# Patient Record
Sex: FEMALE | Race: WHITE | NOT HISPANIC OR LATINO | Employment: OTHER | URBAN - METROPOLITAN AREA
[De-identification: names, ages, dates, MRNs, and addresses within clinical notes are randomized per-mention and may not be internally consistent; named-entity substitution may affect disease eponyms.]

---

## 2018-02-14 NOTE — PROGRESS NOTES
PAT note: PAT call made 02/14/2018 at 1437. Spoke with pt, Aracelis. Health history, allergies, medications and pre-procedure instructions reviewed with patient.Pt verbalized understanding of instructions.Pt requests no sedation.

## 2018-02-16 ENCOUNTER — HOSPITAL ENCOUNTER (OUTPATIENT)
Dept: RADIOLOGY | Facility: REHABILITATION | Age: 48
End: 2018-02-16
Attending: PHYSICAL MEDICINE & REHABILITATION
Payer: COMMERCIAL

## 2018-02-16 ENCOUNTER — HOSPITAL ENCOUNTER (OUTPATIENT)
Dept: PAIN MANAGEMENT | Facility: REHABILITATION | Age: 48
End: 2018-02-16
Attending: PHYSICAL MEDICINE & REHABILITATION
Payer: COMMERCIAL

## 2018-02-16 VITALS
OXYGEN SATURATION: 97 % | TEMPERATURE: 98 F | RESPIRATION RATE: 17 BRPM | BODY MASS INDEX: 24.4 KG/M2 | HEIGHT: 70 IN | DIASTOLIC BLOOD PRESSURE: 73 MMHG | SYSTOLIC BLOOD PRESSURE: 115 MMHG | HEART RATE: 73 BPM | WEIGHT: 170.42 LBS

## 2018-02-16 PROCEDURE — 700111 HCHG RX REV CODE 636 W/ 250 OVERRIDE (IP)

## 2018-02-16 PROCEDURE — 62321 NJX INTERLAMINAR CRV/THRC: CPT

## 2018-02-16 PROCEDURE — 700117 HCHG RX CONTRAST REV CODE 255

## 2018-02-16 RX ORDER — LIDOCAINE HYDROCHLORIDE 20 MG/ML
INJECTION, SOLUTION EPIDURAL; INFILTRATION; INTRACAUDAL; PERINEURAL
Status: COMPLETED
Start: 2018-02-16 | End: 2018-02-16

## 2018-02-16 RX ORDER — DEXAMETHASONE SODIUM PHOSPHATE 10 MG/ML
INJECTION, SOLUTION INTRAMUSCULAR; INTRAVENOUS
Status: COMPLETED
Start: 2018-02-16 | End: 2018-02-16

## 2018-02-16 RX ADMIN — DEXAMETHASONE SODIUM PHOSPHATE 10 MG: 10 INJECTION, SOLUTION INTRAMUSCULAR; INTRAVENOUS at 09:09

## 2018-02-16 RX ADMIN — IOHEXOL 3 ML: 240 INJECTION, SOLUTION INTRATHECAL; INTRAVASCULAR; INTRAVENOUS; ORAL at 09:08

## 2018-02-16 ASSESSMENT — PAIN SCALES - GENERAL
PAINLEVEL_OUTOF10: 7
PAINLEVEL_OUTOF10: 6

## 2018-02-16 NOTE — PROGRESS NOTES
.Fluids tolerated well. Ice compress applied to the affected area. Reviewed home care instructions and understood by patient.No procedural complication noted.

## 2018-02-16 NOTE — PROGRESS NOTES
Medication reconciliation reviewed with patient. Denied taking any blood thinners and any  any anti- inflammatories medications. .Patient had a  / spouse .Home care form and verbal  instruction given to patient and verbalized understanding.Patient refused sedation and she ate this morning at 0700 AM Dr. Langford made aware . Hand off reported to ACE Moreira RN.

## 2018-02-16 NOTE — PROGRESS NOTES
Patient positioned pre-procedure by RN,CST, and xray tech. Pillow placed under lower legs and feet for support.

## 2018-02-17 NOTE — PROCEDURES
DATE OF SERVICE:  02/16/2018    PROCEDURES PERFORMED:  C7-T1 interlaminar epidural steroid injection with 20   mg of Decadron under fluoroscopic guidance.    INDICATIONS:  The patient is a patient of [facility] with neck   pain, primarily left-sided and symptoms going down into the hands bilaterally.    She does have cervical spondylosis at C5-C6 and C6-C7 with mild central   canal narrowing, which is felt to be the cause of her symptoms.  For this   reason, a C7-T1 interlaminar epidural steroid injection was chosen for today.    DESCRIPTION OF PROCEDURE:  After appropriate informed consent was obtained,   she was placed prone on the table.    Her skin was thoroughly cleansed with Betadine swabs x3.    Following this, the subcutaneous intermuscular and interligamentous region was   anesthetized with lidocaine.    A 3.5-inch, 20-gauge Tuohy catheter was directed under intermittent   fluoroscopic guidance to the left C7-T1 lamina.  Once the lamina was detected,   the needle was directed cephalad medially and loss of resistance technique   was used to determine the epidural space.    EPIDUROGRAM:  Omnipaque 1 mL was placed in the midline at C7-T1.  This proved   to be subligamentous with good cephalad and caudad flow and the flow was   unrestricted.  A contralateral oblique view was used to determine proper depth   and dye flow as well.    Decadron 20 mg was placed at C7-T1.    A washout view showed dye flowing up to C5-C6 on the left and to C6-C7 clearly   on the right.    She tolerated the procedure well without procedure complications.    She was referred to the recovery area and will follow up in the office in the   next 2-3 weeks to monitor response to the injection.       ____________________________________     MD TRAN Cordova / JESUS    DD:  02/16/2018 09:14:25  DT:  02/16/2018 23:33:26    D#:  [id number]  Job#:  [id number]

## 2018-03-21 ENCOUNTER — TELEPHONE (OUTPATIENT)
Dept: MEDICAL GROUP | Facility: PHYSICIAN GROUP | Age: 48
End: 2018-03-21

## 2018-03-21 ENCOUNTER — OFFICE VISIT (OUTPATIENT)
Dept: MEDICAL GROUP | Facility: PHYSICIAN GROUP | Age: 48
End: 2018-03-21
Payer: COMMERCIAL

## 2018-03-21 VITALS
BODY MASS INDEX: 24.77 KG/M2 | SYSTOLIC BLOOD PRESSURE: 110 MMHG | DIASTOLIC BLOOD PRESSURE: 72 MMHG | HEIGHT: 70 IN | RESPIRATION RATE: 14 BRPM | OXYGEN SATURATION: 93 % | WEIGHT: 173 LBS | HEART RATE: 74 BPM | TEMPERATURE: 98.6 F

## 2018-03-21 DIAGNOSIS — N95.1 MENOPAUSAL SYNDROME (HOT FLASHES): ICD-10-CM

## 2018-03-21 DIAGNOSIS — G89.29 CHRONIC RIGHT-SIDED LOW BACK PAIN WITH RIGHT-SIDED SCIATICA: ICD-10-CM

## 2018-03-21 DIAGNOSIS — G89.29 NECK PAIN, CHRONIC: ICD-10-CM

## 2018-03-21 DIAGNOSIS — J30.2 CHRONIC SEASONAL ALLERGIC RHINITIS, UNSPECIFIED TRIGGER: ICD-10-CM

## 2018-03-21 DIAGNOSIS — E03.9 ACQUIRED HYPOTHYROIDISM: ICD-10-CM

## 2018-03-21 DIAGNOSIS — H92.01 RIGHT EAR PAIN: ICD-10-CM

## 2018-03-21 DIAGNOSIS — Z00.00 PREVENTATIVE HEALTH CARE: ICD-10-CM

## 2018-03-21 DIAGNOSIS — M54.2 NECK PAIN, CHRONIC: ICD-10-CM

## 2018-03-21 DIAGNOSIS — M54.41 CHRONIC RIGHT-SIDED LOW BACK PAIN WITH RIGHT-SIDED SCIATICA: ICD-10-CM

## 2018-03-21 PROCEDURE — 99204 OFFICE O/P NEW MOD 45 MIN: CPT | Performed by: NURSE PRACTITIONER

## 2018-03-21 RX ORDER — THYROID 30 MG/1
30 TABLET ORAL DAILY
COMMUNITY
End: 2019-06-26

## 2018-03-21 RX ORDER — DIAZEPAM 10 MG/1
10 TABLET ORAL EVERY 6 HOURS PRN
COMMUNITY

## 2018-03-21 RX ORDER — CIPROFLOXACIN AND DEXAMETHASONE 3; 1 MG/ML; MG/ML
4 SUSPENSION/ DROPS AURICULAR (OTIC) 2 TIMES DAILY
Qty: 1 BOTTLE | Refills: 0 | Status: SHIPPED | OUTPATIENT
Start: 2018-03-21 | End: 2018-11-26

## 2018-03-21 ASSESSMENT — PATIENT HEALTH QUESTIONNAIRE - PHQ9: CLINICAL INTERPRETATION OF PHQ2 SCORE: 0

## 2018-03-21 NOTE — TELEPHONE ENCOUNTER
VOICEMAIL  1. Caller Name: Aracelis                      Call Back Number: [phone] (home)       2. Message: Patient states the ear drops were $300 and she cannot afford that. They have ciprodex or ofloxacin in stock. Please send new RX.     3. Patient approves office to leave a detailed voicemail/MyChart message: N\A

## 2018-03-21 NOTE — ASSESSMENT & PLAN NOTE
Chronic back pain.  Has herniated disk in neck.  Recent epidural in neck without good results.    Sees Dr. Cosby at [facility] who is following her neck and back pain.  Takes CBD oil, tumeric, luten, fish oil, collagen. Takes 5-10 mg valium 4-5 times/month.

## 2018-03-21 NOTE — PROGRESS NOTES
Aracelis Seals is a 47 y.o. female here today to establish care and for evaluation and management of:    HPI:    Hypothyroidism  Taking on [place] 30 mg daily for last 5-6 years.  States she is feeling good, no hair loss reported, no difficulty swallowing.     Lumbago  Chronic back pain.  Has herniated disk in neck.  Recent epidural in neck without good results.    Sees Dr. Cosby at [facility] who is following her neck and back pain.  Takes CBD oil, tumeric, luten, fish oil, collagen. Takes 5-10 mg valium 4-5 times/month.     Neck pain, chronic  Seeing a physical therapist, using traction on neck bid and this is helping.     Right ear pain  Started last night with right ear pain, fever, chills, no recent cold.  History of allergies. Tragul pain, no exudate.     Menopausal syndrome (hot flashes)  Reports improvement with progesterone.  States that due to her job as a , she is exposed to plant based (strogen) dyes and the progesterone helps balance this.  Has used compounded cream without good results.  Requesting refill on tablets.  Up to date on mammogram.  Records requested.       Current medicines (including changes today)  Current Outpatient Prescriptions   Medication Sig Dispense Refill   • thyroid (ARMOUR THYROID) 30 MG Tab Take 30 mg by mouth every day.     • LUTEIN PO Take  by mouth.     • ciprofloxacin (CIPRO HC) 0.2-1 % Suspension Place 3 Drops in right ear 2 times a day. Administer drops to both ears. 1 Bottle 0   • progesterone (PROMETRIUM) 100 MG Cap Take 1 Cap by mouth every day. 30 Cap 11   • TURMERIC PO Take  by mouth.     • Non Formulary Request      • Cholecalciferol (VITAMIN D PO) Take  by mouth.     • MAGNESIUM PO Take  by mouth.     • diazepam (VALIUM) 10 MG tablet Take 10 mg by mouth every 6 hours as needed for Anxiety.       No current facility-administered medications for this visit.        She  has a past medical history of Allergy; Hypothyroidism; Insomnia; and  "Lumbago.    She  has a past surgical history that includes laminotomy ().    Social History   Substance Use Topics   • Smoking status: Never Smoker   • Smokeless tobacco: Never Used   • Alcohol use 2.4 oz/week     4 Glasses of wine per week      Comment: week       Social History     Social History Narrative   • No narrative on file       Family History   Problem Relation Age of Onset   • Heart Disease Father    • Heart Disease Brother        Family Status   Relation Status   • Mother Alive   • Father Alive   • Sister Alive   • Brother Alive   • Maternal Grandmother    • Maternal Grandfather    • Paternal Grandmother    • Paternal Grandfather    • Brother Alive         ROS  As stated in hpi  All other systems reviewed and are negative     Objective:     Blood pressure 110/72, pulse 74, temperature 37 °C (98.6 °F), resp. rate 14, height 1.778 m (5' 10\"), weight 78.5 kg (173 lb), last menstrual period 2018, SpO2 93 %, not currently breastfeeding. Body mass index is 24.82 kg/m².  Physical Exam:    Constitutional: Alert, no distress.  Skin: Warm, dry, good turgor, no rashes in visible areas.  Eye: Equal, round and reactive, conjunctiva clear, lids normal.  ENMT: Lips without lesions, good dentition, oropharynx clear.  Right ear with pain over tragus.    Neck: Trachea midline, no masses, no thyromegaly. No cervical or supraclavicular lymphadenopathy.  Respiratory: Unlabored respiratory effort, lungs clear to auscultation, no wheezes, no ronchi.  Cardiovascular: Normal S1, S2, no murmur, no edema.  Abdomen: Soft, non-tender, no masses, no hepatosplenomegaly.  Psych: Alert and oriented x3, normal affect and mood.        Assessment and Plan:   The following treatment plan was discussed    1. Acquired hypothyroidism  This is a new problem to me. Chronic. Ongoing. [place] 30 mg daily. Due for TSH.  Labs ordered. Monitor and follow.    2. Chronic right-sided low back pain with " right-sided sciatica  This is a new problem to me. Chronic. Ongoing issue. Is seeing [facility] spine doctor down some. Patient is taking several over-the-counter items including tumor can CBD oil.    3. Chronic seasonal allergic rhinitis, unspecified trigger  This is a new problem to me. Chronic. Ongoing. Size all daily. Recommended she add Flonase for breakthrough symptoms. Monitor and follow.    4. Neck pain, chronic  This is a new problem to me. Chronic. Ongoing issue. Unstable. Is being followed by [facility] spine. Recent epidural without improvement. She is doing some traction and physical therapy. She hopes this will help her. Monitor and follow.    5. Preventative health care  Due for preventative healthcare labs. Will also rule out any autoimmune disease due to her history of thyroid issues and chronic back and neck pain. Monitor and follow labs.  - CBC WITH DIFFERENTIAL; Future  - COMP METABOLIC PANEL; Future  - TSH WITH REFLEX TO FT4; Future  - LIPID PROFILE; Future  - VITAMIN D,25 HYDROXY; Future  - CAREY ANTIBODY WITH REFLEX; Future  - RHEUMATOID ARTHRITIS FACTOR; Future  - WESTERGREN SED RATE; Future  - CRP QUANTITIVE (NON-CARDIAC); Future    6. Right ear pain  This is a new problem to me. Acute. This may represent an otitis externa. Cortisporin/Cipro drops to right ear twice a day. Monitor and follow.    7. Menopausal syndrome (hot flashes)  This is a new problem to me. Chronic. Ongoing. Progesterone 100 mg daily. Refill provided. Monitor and follow. Request records for most recent Pap and mammogram.      Records requested.  Followup: Return in about 1 year (around 3/21/2019) for Annual.

## 2018-03-21 NOTE — ASSESSMENT & PLAN NOTE
Reports improvement with progesterone.  States that due to her job as a , she is exposed to plant based (strogen) dyes and the progesterone helps balance this.  Has used compounded cream without good results.  Requesting refill on tablets.  Up to date on mammogram.  Records requested.

## 2018-03-21 NOTE — ASSESSMENT & PLAN NOTE
Taking on [place] 30 mg daily for last 5-6 years.  States she is feeling good, no hair loss reported, no difficulty swallowing.

## 2018-03-21 NOTE — ASSESSMENT & PLAN NOTE
Started last night with right ear pain, fever, chills, no recent cold.  History of allergies. Tragul pain, no exudate.

## 2018-03-21 NOTE — LETTER
[facility]  ZACK Dumas  [address]  [place] [address]  Fax: [phone]   Authorization for Release/Disclosure of   Protected Health Information   Name: ARACELIS FARAH : 1970 SSN: [id number]   Address: [address] [place], [place] [address] [place] [address] Phone:    [phone] (home)    I authorize the entity listed below to release/disclose the PHI below to:   [facility]/ZACK Dumas and ZACK Dumas   Provider or Entity Name:  [facility]   Address   City, State, Zip   Phone:      Fax:     Reason for request: continuity of care   Information to be released:    [  ] LAST COLONOSCOPY,  including any PATH REPORT and follow-up  [  ] LAST FIT/COLOGUARD RESULT [  ] LAST DEXA  [ x ] LAST MAMMOGRAM  [  ] LAST PAP  [  ] LAST LABS [  ] RETINA EXAM REPORT  [  ] IMMUNIZATION RECORDS  [  ] Release all info      [  ] Check here and initial the line next to each item to release ALL health information INCLUDING  _____ Care and treatment for drug and / or alcohol abuse  _____ HIV testing, infection status, or AIDS  _____ Genetic Testing    DATES OF SERVICE OR TIME PERIOD TO BE DISCLOSED: _____________  I understand and acknowledge that:  * This Authorization may be revoked at any time by you in writing, except if your health information has already been used or disclosed.  * Your health information that will be used or disclosed as a result of you signing this authorization could be re-disclosed by the recipient. If this occurs, your re-disclosed health information may no longer be protected by State or Federal laws.  * You may refuse to sign this Authorization. Your refusal will not affect your ability to obtain treatment.  * This Authorization becomes effective upon signing and will  on (date) __________.      If no date is indicated, this Authorization will  one (1) year from the signature date.    Name: Aracelis Farah    Signature:   Date:     3/21/2018          PLEASE FAX REQUESTED RECORDS BACK TO: [phone]

## 2018-03-22 NOTE — TELEPHONE ENCOUNTER
Ciprodex is $290.00 call [facility] to look for alternatives Patient has a high deductible for non maintenance  medication so [facility] was able to help by giving me a web site ciprodex.com to get a saving card that can drop the co-pay to $30.00.  Pt has been notified of this.

## 2018-03-27 ENCOUNTER — TELEPHONE (OUTPATIENT)
Dept: MEDICAL GROUP | Facility: PHYSICIAN GROUP | Age: 48
End: 2018-03-27

## 2018-03-27 DIAGNOSIS — M54.9 CHRONIC BACK PAIN, UNSPECIFIED BACK LOCATION, UNSPECIFIED BACK PAIN LATERALITY: ICD-10-CM

## 2018-03-27 DIAGNOSIS — G89.29 CHRONIC BACK PAIN, UNSPECIFIED BACK LOCATION, UNSPECIFIED BACK PAIN LATERALITY: ICD-10-CM

## 2018-03-28 NOTE — TELEPHONE ENCOUNTER
Phone Number Called: [phone]    Message: Patients insurance requires a referral so she can be seen by [facility], patient gets epidural there, they are also want to know if the referral can be dated for January 22 as well, patient saw them in January but was not established with PCP until 3/21/18. Please advise    Left Message for patient to call back: no

## 2018-03-28 NOTE — TELEPHONE ENCOUNTER
VOICEMAIL  1. Caller Name: Tommy[facility]                      Call Back Number: [phone]    2. Message: Tommy stated having issue with patient and have question please call    3. Patient approves office to leave a detailed voicemail/MyChart message: N\A

## 2018-04-10 ENCOUNTER — HOSPITAL ENCOUNTER (OUTPATIENT)
Dept: LAB | Facility: MEDICAL CENTER | Age: 48
End: 2018-04-10
Attending: NURSE PRACTITIONER
Payer: COMMERCIAL

## 2018-04-10 DIAGNOSIS — Z00.00 PREVENTATIVE HEALTH CARE: ICD-10-CM

## 2018-04-10 LAB
25(OH)D3 SERPL-MCNC: 34 NG/ML (ref 30–100)
ALBUMIN SERPL BCP-MCNC: 4.9 G/DL (ref 3.2–4.9)
ALBUMIN/GLOB SERPL: 2.9 G/DL
ALP SERPL-CCNC: 35 U/L (ref 30–99)
ALT SERPL-CCNC: 24 U/L (ref 2–50)
ANION GAP SERPL CALC-SCNC: 4 MMOL/L (ref 0–11.9)
AST SERPL-CCNC: 18 U/L (ref 12–45)
BASOPHILS # BLD AUTO: 0.8 % (ref 0–1.8)
BASOPHILS # BLD: 0.04 K/UL (ref 0–0.12)
BILIRUB SERPL-MCNC: 0.5 MG/DL (ref 0.1–1.5)
BUN SERPL-MCNC: 18 MG/DL (ref 8–22)
CALCIUM SERPL-MCNC: 9.1 MG/DL (ref 8.5–10.5)
CHLORIDE SERPL-SCNC: 105 MMOL/L (ref 96–112)
CHOLEST SERPL-MCNC: 255 MG/DL (ref 100–199)
CO2 SERPL-SCNC: 28 MMOL/L (ref 20–33)
CREAT SERPL-MCNC: 0.74 MG/DL (ref 0.5–1.4)
CRP SERPL HS-MCNC: 0.03 MG/DL (ref 0–0.75)
EOSINOPHIL # BLD AUTO: 0.17 K/UL (ref 0–0.51)
EOSINOPHIL NFR BLD: 3.3 % (ref 0–6.9)
ERYTHROCYTE [DISTWIDTH] IN BLOOD BY AUTOMATED COUNT: 44.8 FL (ref 35.9–50)
ERYTHROCYTE [SEDIMENTATION RATE] IN BLOOD BY WESTERGREN METHOD: 8 MM/HOUR (ref 0–20)
GLOBULIN SER CALC-MCNC: 1.7 G/DL (ref 1.9–3.5)
GLUCOSE SERPL-MCNC: 88 MG/DL (ref 65–99)
HCT VFR BLD AUTO: 41.3 % (ref 37–47)
HDLC SERPL-MCNC: 64 MG/DL
HGB BLD-MCNC: 14.1 G/DL (ref 12–16)
IMM GRANULOCYTES # BLD AUTO: 0.01 K/UL (ref 0–0.11)
IMM GRANULOCYTES NFR BLD AUTO: 0.2 % (ref 0–0.9)
LDLC SERPL CALC-MCNC: 158 MG/DL
LYMPHOCYTES # BLD AUTO: 1.76 K/UL (ref 1–4.8)
LYMPHOCYTES NFR BLD: 34.6 % (ref 22–41)
MCH RBC QN AUTO: 33.7 PG (ref 27–33)
MCHC RBC AUTO-ENTMCNC: 34.1 G/DL (ref 33.6–35)
MCV RBC AUTO: 98.6 FL (ref 81.4–97.8)
MONOCYTES # BLD AUTO: 0.36 K/UL (ref 0–0.85)
MONOCYTES NFR BLD AUTO: 7.1 % (ref 0–13.4)
NEUTROPHILS # BLD AUTO: 2.75 K/UL (ref 2–7.15)
NEUTROPHILS NFR BLD: 54 % (ref 44–72)
NRBC # BLD AUTO: 0 K/UL
NRBC BLD-RTO: 0 /100 WBC
PLATELET # BLD AUTO: 240 K/UL (ref 164–446)
PMV BLD AUTO: 9.2 FL (ref 9–12.9)
POTASSIUM SERPL-SCNC: 4.2 MMOL/L (ref 3.6–5.5)
PROT SERPL-MCNC: 6.6 G/DL (ref 6–8.2)
RBC # BLD AUTO: 4.19 M/UL (ref 4.2–5.4)
RHEUMATOID FACT SER IA-ACNC: <10 IU/ML (ref 0–14)
SODIUM SERPL-SCNC: 137 MMOL/L (ref 135–145)
TRIGL SERPL-MCNC: 163 MG/DL (ref 0–149)
TSH SERPL DL<=0.005 MIU/L-ACNC: 2.22 UIU/ML (ref 0.38–5.33)
WBC # BLD AUTO: 5.1 K/UL (ref 4.8–10.8)

## 2018-04-10 PROCEDURE — 86431 RHEUMATOID FACTOR QUANT: CPT

## 2018-04-10 PROCEDURE — 86235 NUCLEAR ANTIGEN ANTIBODY: CPT | Mod: 91

## 2018-04-10 PROCEDURE — 82306 VITAMIN D 25 HYDROXY: CPT

## 2018-04-10 PROCEDURE — 85652 RBC SED RATE AUTOMATED: CPT

## 2018-04-10 PROCEDURE — 85025 COMPLETE CBC W/AUTO DIFF WBC: CPT

## 2018-04-10 PROCEDURE — 36415 COLL VENOUS BLD VENIPUNCTURE: CPT

## 2018-04-10 PROCEDURE — 80053 COMPREHEN METABOLIC PANEL: CPT

## 2018-04-10 PROCEDURE — 86038 ANTINUCLEAR ANTIBODIES: CPT

## 2018-04-10 PROCEDURE — 80061 LIPID PANEL: CPT

## 2018-04-10 PROCEDURE — 86140 C-REACTIVE PROTEIN: CPT

## 2018-04-10 PROCEDURE — 86225 DNA ANTIBODY NATIVE: CPT

## 2018-04-10 PROCEDURE — 84443 ASSAY THYROID STIM HORMONE: CPT

## 2018-04-12 LAB — NUCLEAR IGG SER QL IA: NORMAL

## 2018-04-15 ENCOUNTER — PATIENT MESSAGE (OUTPATIENT)
Dept: MEDICAL GROUP | Facility: PHYSICIAN GROUP | Age: 48
End: 2018-04-15

## 2018-04-16 ENCOUNTER — TELEPHONE (OUTPATIENT)
Dept: MEDICAL GROUP | Facility: PHYSICIAN GROUP | Age: 48
End: 2018-04-16

## 2018-04-16 NOTE — TELEPHONE ENCOUNTER
Phone Number Called: [phone] (home)     Message: Left message to call back      Left Message for patient to call back: yes

## 2018-04-16 NOTE — TELEPHONE ENCOUNTER
Patient called back, I called her back and left her a message informing her the results have been released to her my chart.

## 2018-06-01 ENCOUNTER — OCCUPATIONAL THERAPY (OUTPATIENT)
Dept: OCCUPATIONAL THERAPY | Facility: REHABILITATION | Age: 48
End: 2018-06-01
Attending: PHYSICIAN ASSISTANT
Payer: COMMERCIAL

## 2018-06-01 DIAGNOSIS — G56.23 LESIONS OF BOTH ULNAR NERVES: ICD-10-CM

## 2018-06-01 PROCEDURE — G8985 CARRY GOAL STATUS: HCPCS | Mod: CJ

## 2018-06-01 PROCEDURE — G8984 CARRY CURRENT STATUS: HCPCS | Mod: CK

## 2018-06-01 PROCEDURE — 97166 OT EVAL MOD COMPLEX 45 MIN: CPT

## 2018-06-01 SDOH — ECONOMIC STABILITY: GENERAL: QUALITY OF LIFE: GOOD

## 2018-06-01 ASSESSMENT — ENCOUNTER SYMPTOMS
ALLEVIATING FACTORS: ICE
PAIN SCALE: 7
QUALITY: SHARP
PAIN TIMING: WHEN ACTIVE
QUALITY: THROBBING
EXACERBATED BY: ACTIVITY
PAIN SCALE AT HIGHEST: 10
ALLEVIATING FACTORS: REST
AWAKENINGS PER NIGHT: 3
ALLEVIATING FACTORS: PAIN MEDICATION
ALLEVIATING FACTORS: HOME EXERCISE PROGRAM
PAIN SCALE AT LOWEST: 3
PAIN TIMING: IN THE AFTERNOON
ALLEVIATING FACTORS: PHARMACOTHERAPY
EXACERBATED BY: HOUSEWORK

## 2018-06-01 NOTE — OP THERAPY EVALUATION
Outpatient Occupational Therapy  HAND THERAPY INITIAL EVALUATION    [facility] [address]  [address]  [place] [address]  Phone:  [phone]  Fax:  [phone]    Date of Evaluation: 2018    Patient: Aracelis Seals  YOB: 1970  MRN: [id number]     Referring Provider: MARISOL [id number]  [place] [address]   Referring Diagnosis Lesion of ulnar nerve, unspecified upper limb [G56.20]     Time Calculation  Start time: 0  Stop time: 0540 Time Calculation (min): 70 minutes     Occupational Therapy Occurrence Codes    Date of onset of impairment:  17   Date of occupational therapy care plan established or reviewed:  18   Date of occupational therapy treatment started:  18          Chief Complaint: Elbow Problem (bilateral cubital tunnel syndrome)    Visit Diagnoses     ICD-10-CM   1. Lesions of both ulnar nerves G56.23       Subjective:   History of Present Illness:     Date of onset:  2017    Mechanism of injury:  Progressive increase in bilateral UE paresthesias, pain and weakness from her elbows to digits affecting her ability to perform her ADLs, IADLs, and work activities.  Quality of life:  Good  Sleep disturbance:  Interrupted sleep  # Times/Night awakened:  3  Pain:     Current pain ratin    At best pain rating:  3    At worst pain rating:  10    Location:  Medial elbows    Quality:  Sharp and throbbing    Pain timing:  In the afternoon and when active (progresses throughout the day)    Relieving factors:  Rest, ice, pain medication, home exercise program and pharmacotherapy (neck traction)    Aggravating factors:  Activity and housework (working as a [employer])    Progression:  Stable  Hand dominance:  Left  Diagnostic Tests:     EMG: abnormal (lesions of bilateral ulnar nerve, performed on 18)    Treatments:     Previous treatment:  Medication    Current treatment:  Stretching, massage and  chiropractic  Activities of Daily Living:     Patient reported ADL status: Independent ADLs except requires assist with hair r/t paresthesias,  performs majority of housework and cares for cats.  Enjoys gardening and exercise.  Working PT as a [employer].  Patient Goals:     Patient goals for therapy:  Decreased pain, independence with ADLs/IADLs, return to sport/leisure activities and increased strength    Other patient goals:  To continue work without pain and numbness      Past Medical History:   Diagnosis Date   • Allergy    • Hypothyroidism    • Insomnia    • Lumbago      Past Surgical History:   Procedure Laterality Date   • LAMINOTOMY  2008    dr jones     Social History   Substance Use Topics   • Smoking status: Never Smoker   • Smokeless tobacco: Never Used   • Alcohol use 2.4 oz/week     4 Glasses of wine per week      Comment: week     Family and Occupational History     Social History   • Marital status:      Spouse name: N/A   • Number of children: N/A   • Years of education: N/A       Objective   Observations   Additional Observation Details  No observable deformities and Mild edema bilateral palmar hands L > R.  No obsevable joint or skin abnormalities.    Neurological Testing   Sensation   Wrist/Hand   Left   Paresthesia: light touch    Right   Paresthesia: light touch        Additional Neurological Details  [place]: WNL  Pt reports paresthesias RH digits 4-5, LH digits 1-3.    Palpation   Left   Tenderness of the extensor carpi radialis longus, extensor carpi ulnaris, flexor carpi radialis and flexor carpi ulnaris.   Right Tenderness of the extensor carpi radialis longus, extensor carpi ulnaris, flexor carpi radialis and flexor carpi ulnaris.     Tenderness     Left Wrist/Hand   No tenderness in the lateral epicondyle and medial epicondyle.     Right Wrist/Hand   No tenderness in the lateral epicondyle and medial epicondyle.     Left Elbow   Tenderness in the cubital tunnel.  No tenderness in the lateral epicondyle and medial epicondyle.     Right Elbow   Tenderness in the cubital tunnel. No tenderness in the lateral epicondyle and medial epicondyle.     Active Range of Motion     Left Wrist   Normal active range of motion    Right Wrist   Normal active range of motion    Left Thumb     Normal active range of motion  Opposition: WNL    Right Thumb     Normal active range of motion  Opposition: WNL    Left Digits    Normal active range of motion    Right Digits   Normal active range of motion    Left Elbow   Normal active range of motion    Right Elbow   Normal active range of motion    Strength     Left Wrist/Hand   Normal wrist strength     (2nd hand position)     Trial 1: 62    Thumb Strength  Key/Lateral Pinch     Trail 1: 14  Tip/Two-Point Pinch     Trial 1: 9    Right Wrist/Hand   Normal wrist strength     (2nd hand position)     Trial 1: 60    Thumb Strength   Key/Lateral Pinch     Trial 1: 17  Tip/Two-Point Pinch     Trial 1: 9    Left Elbow   Normal strength    Right Elbow   Normal strength    Additional Strength Details  Lumbricals 4/5, interossei 4/5    Tests     Left Wrist/Hand   Positive Phalen's sign and Tinel's sign (median nerve).   Negative Finkelstein's.     Right Wrist/Hand   Positive Phalen's sign and Tinel's sign (median nerve).   Negative Finkelstein's.     Left Elbow   Positive Tinel's sign (cubital tunnel).   Negative active medial epicondylitis and Cozen's.     Right Elbow   Positive Tinel's sign (cubital tunnel).   Negative active medial epicondylitis and Cozen's.         Therapeutic Exercises (CPT 33545):     1. BUE    Therapeutic Exercise Summary:  Performed ulnar and median nerve glides, to perform 2 reps of 30 second hold 3 x day. Active long arm stretches for wrist/digit flexors, forearm in supination, flex elbow, extend elbow, extend wrist/digits x 10 reps.  Instructed on positioning to avoid pressure on elbow, issued and instructed on stockinette  sleeves to be worn with sock insert at night to prevent elbow flexion, and to apply cold packs 10 minutes 3 x day.  Pt instructed to wear wrist braces at night and when resting, however, volar metal bar needs to be straightened.   Pt to bring in next visit.  Issued written HEP with good understanding.      Time-based treatments/modalities:  Therapeutic exercise minutes (CPT 47071): 15 minutes        Assessment and Plan:   Problem list/assessment: abnormal or restricted ROM, decreased HEP knowledge, decreased sensation, limited ADL's and pain    Other problems:  Limited ability to perform work and IADLs  Assessment details:  Pt is a 47 y.o female who reports a progressive increase in bilateral UE paresthesias, pain and weakness from her elbows to digits affecting ability to perform ADLs, IADLs, and work activities as a hairstylist.  Pt presents with tenderness and soft tissue restrictions in bilateral volar/dorsal forearm musculature, mild edema, dysesthesia, and test's positive for bilateral cubital tunnel syndrome and carpal tunnel syndrome.      Goals:   Short Term Goals: decrease edema, decrease pain, increase ADL independence, increase soft tissue/skin mobility and independent with HEP performance  Short term goal timespan:  1-2 weeks    Long Term Goals:   Pt will be independent ADLs and light IADLs with <= 3/10 pain  Pt will perform work-related activities with minimal occasional reports of paresthesias while demonstrating good body mechanics  Pt will be independent positioning and non-pharmacological pain management techniques  Pt will be independent HEP for stretching and strengthening  Pt will score <= 35% disability on the Quick Dash  Long term goal timespan:  2-4 weeks    Plan:   Occupational/Hand Therapy options:  Occupational therapy treatment to continue  Planned therapy interventions:  Adaptive training to increase functional performance, home exercise training, manual therapy (CPT 75777), pain  management, splinting/orthosis, AROM, A/AROM, PROM, passive stretch, edema management, graded resistive tasks to increase strength and sensory retraining/integration techniques (CPT 14739)  Planned modality interventions: iontophoresis (CPT 40267), electrical stimulation - attended (CPT 05999), ice pack (CPT 33859) and moist hot pack (CPT 51487)  Prognosis: good    Frequency:  2x week  Duration in weeks:  4  Duration in visits:  8  Discussed with:  Patient  Patient/caregiver understanding of therapy treatment and goals: Good understanding of therapy treatment and goals      Functional Limitation G-Codes and Severity Modifiers  OT Functional Assessment Tool Used: Quick Dash  OT Functional Assessment Score: 59% Disability  Quickdash General Total Score: 59.09   Current: Carry: Current (): WILMAR   Goal: Carry: Goal  (): CJ       Referring provider co-signature:  I have reviewed this plan of care and my co-signature certifies the need for services.  Certification Dates:   From 6/1/18     To 7/20/18    Physician Signature: ________________________________ Date: ______________

## 2018-06-04 ENCOUNTER — OFFICE VISIT (OUTPATIENT)
Dept: URGENT CARE | Facility: PHYSICIAN GROUP | Age: 48
End: 2018-06-04
Payer: COMMERCIAL

## 2018-06-04 VITALS
OXYGEN SATURATION: 100 % | DIASTOLIC BLOOD PRESSURE: 74 MMHG | HEART RATE: 64 BPM | TEMPERATURE: 98.2 F | WEIGHT: 173 LBS | HEIGHT: 70 IN | BODY MASS INDEX: 24.77 KG/M2 | SYSTOLIC BLOOD PRESSURE: 102 MMHG

## 2018-06-04 DIAGNOSIS — M22.2X1 PATELLOFEMORAL PAIN SYNDROME OF RIGHT KNEE: ICD-10-CM

## 2018-06-04 PROCEDURE — 99214 OFFICE O/P EST MOD 30 MIN: CPT | Performed by: FAMILY MEDICINE

## 2018-06-04 RX ORDER — DICLOFENAC SODIUM 75 MG/1
TABLET, DELAYED RELEASE ORAL
COMMUNITY
Start: 2018-05-16

## 2018-06-04 ASSESSMENT — PAIN SCALES - GENERAL: PAINLEVEL: 2=MINIMAL-SLIGHT

## 2018-06-07 ENCOUNTER — APPOINTMENT (OUTPATIENT)
Dept: OCCUPATIONAL THERAPY | Facility: REHABILITATION | Age: 48
End: 2018-06-07
Attending: PHYSICAL MEDICINE & REHABILITATION
Payer: COMMERCIAL

## 2018-06-12 ENCOUNTER — APPOINTMENT (OUTPATIENT)
Dept: OCCUPATIONAL THERAPY | Facility: REHABILITATION | Age: 48
End: 2018-06-12
Attending: PHYSICAL MEDICINE & REHABILITATION
Payer: COMMERCIAL

## 2018-06-18 ASSESSMENT — ENCOUNTER SYMPTOMS
JOINT SWELLING: 1
WEAKNESS: 0
NUMBNESS: 0

## 2018-06-18 NOTE — PROGRESS NOTES
"Subjective:   Aracelis Seals is a 47 y.o. female who presents for Knee Pain ([id number] R knee swelling)     Knee Pain   This is a new problem. The current episode started more than 1 month ago. The problem occurs intermittently. The problem has been waxing and waning. Associated symptoms include joint swelling. Pertinent negatives include no numbness or weakness.     Review of Systems   Musculoskeletal: Positive for joint swelling.   Neurological: Negative for weakness and numbness.      Objective:   /74   Pulse 64   Temp 36.8 °C (98.2 °F)   Ht 1.778 m (5' 10\")   Wt 78.5 kg (173 lb)   SpO2 100%   BMI 24.82 kg/m²   Physical Exam   Constitutional: She is oriented to person, place, and time. She appears well-developed and well-nourished. No distress.   HENT:   Head: Normocephalic and atraumatic.   Eyes: Conjunctivae and EOM are normal. Pupils are equal, round, and reactive to light.   Cardiovascular: Normal rate and regular rhythm.    No murmur heard.  Pulmonary/Chest: Effort normal and breath sounds normal. No respiratory distress.   Abdominal: Soft. She exhibits no distension. There is no tenderness.   Musculoskeletal:        Right knee: She exhibits decreased range of motion, effusion and abnormal patellar mobility. She exhibits no LCL laxity and no MCL laxity.   Neurological: She is alert and oriented to person, place, and time. She has normal reflexes. No sensory deficit.   Skin: Skin is warm and dry.   Psychiatric: She has a normal mood and affect.        Assessment/Plan:   1. Patellofemoral pain syndrome of right knee  - diclofenac EC (VOLTAREN) 75 MG Tablet Delayed Response;   - REFERRAL TO SPORTS MEDICINE    Differential diagnosis, natural history, supportive care, and indications for immediate follow-up discussed. 4  "

## 2018-08-23 ENCOUNTER — HOSPITAL ENCOUNTER (OUTPATIENT)
Dept: RADIOLOGY | Facility: MEDICAL CENTER | Age: 48
End: 2018-08-23
Attending: PHYSICAL MEDICINE & REHABILITATION
Payer: COMMERCIAL

## 2018-08-23 DIAGNOSIS — M79.671 PAIN IN RIGHT FOOT: ICD-10-CM

## 2018-08-23 DIAGNOSIS — M25.571 RIGHT ANKLE PAIN, UNSPECIFIED CHRONICITY: ICD-10-CM

## 2018-08-23 PROCEDURE — 73610 X-RAY EXAM OF ANKLE: CPT | Mod: RT

## 2018-08-23 PROCEDURE — 73630 X-RAY EXAM OF FOOT: CPT | Mod: RT

## 2018-10-09 ENCOUNTER — PATIENT MESSAGE (OUTPATIENT)
Dept: MEDICAL GROUP | Facility: PHYSICIAN GROUP | Age: 48
End: 2018-10-09

## 2018-10-10 ENCOUNTER — HOSPITAL ENCOUNTER (OUTPATIENT)
Dept: RADIOLOGY | Facility: MEDICAL CENTER | Age: 48
End: 2018-10-10

## 2018-10-15 ENCOUNTER — HOSPITAL ENCOUNTER (OUTPATIENT)
Dept: RADIOLOGY | Facility: MEDICAL CENTER | Age: 48
End: 2018-10-15
Attending: NURSE PRACTITIONER
Payer: COMMERCIAL

## 2018-10-15 DIAGNOSIS — N64.52 BLOODY DISCHARGE FROM RIGHT NIPPLE: ICD-10-CM

## 2018-10-15 PROCEDURE — G0279 TOMOSYNTHESIS, MAMMO: HCPCS

## 2018-10-15 PROCEDURE — 76642 ULTRASOUND BREAST LIMITED: CPT | Mod: RT

## 2018-11-26 ENCOUNTER — PATIENT MESSAGE (OUTPATIENT)
Dept: MEDICAL GROUP | Facility: PHYSICIAN GROUP | Age: 48
End: 2018-11-26

## 2018-11-26 ENCOUNTER — HOSPITAL ENCOUNTER (OUTPATIENT)
Dept: LAB | Facility: MEDICAL CENTER | Age: 48
End: 2018-11-26
Attending: NURSE PRACTITIONER
Payer: COMMERCIAL

## 2018-11-26 ENCOUNTER — OFFICE VISIT (OUTPATIENT)
Dept: MEDICAL GROUP | Facility: PHYSICIAN GROUP | Age: 48
End: 2018-11-26
Payer: COMMERCIAL

## 2018-11-26 VITALS
WEIGHT: 176 LBS | SYSTOLIC BLOOD PRESSURE: 116 MMHG | HEIGHT: 70 IN | BODY MASS INDEX: 25.2 KG/M2 | OXYGEN SATURATION: 96 % | TEMPERATURE: 99 F | RESPIRATION RATE: 14 BRPM | HEART RATE: 104 BPM | DIASTOLIC BLOOD PRESSURE: 70 MMHG

## 2018-11-26 DIAGNOSIS — R10.12 LEFT UPPER QUADRANT PAIN: ICD-10-CM

## 2018-11-26 DIAGNOSIS — M54.41 CHRONIC RIGHT-SIDED LOW BACK PAIN WITH RIGHT-SIDED SCIATICA: ICD-10-CM

## 2018-11-26 DIAGNOSIS — G89.29 CHRONIC RIGHT-SIDED LOW BACK PAIN WITH RIGHT-SIDED SCIATICA: ICD-10-CM

## 2018-11-26 DIAGNOSIS — R52 PAIN: ICD-10-CM

## 2018-11-26 DIAGNOSIS — G56.03 BILATERAL CARPAL TUNNEL SYNDROME: ICD-10-CM

## 2018-11-26 DIAGNOSIS — Z12.4 SCREENING FOR CERVICAL CANCER: ICD-10-CM

## 2018-11-26 DIAGNOSIS — N92.6 IRREGULAR PERIODS/MENSTRUAL CYCLES: ICD-10-CM

## 2018-11-26 DIAGNOSIS — Z01.419 WELL WOMAN EXAM WITH ROUTINE GYNECOLOGICAL EXAM: ICD-10-CM

## 2018-11-26 DIAGNOSIS — N64.52 BLOODY DISCHARGE FROM RIGHT NIPPLE: ICD-10-CM

## 2018-11-26 DIAGNOSIS — E78.5 DYSLIPIDEMIA: ICD-10-CM

## 2018-11-26 PROBLEM — H92.01 RIGHT EAR PAIN: Status: RESOLVED | Noted: 2018-03-21 | Resolved: 2018-11-26

## 2018-11-26 LAB — QORDR QORDR: NORMAL

## 2018-11-26 PROCEDURE — 86664 EPSTEIN-BARR NUCLEAR ANTIGEN: CPT

## 2018-11-26 PROCEDURE — 86665 EPSTEIN-BARR CAPSID VCA: CPT

## 2018-11-26 PROCEDURE — 86663 EPSTEIN-BARR ANTIBODY: CPT

## 2018-11-26 PROCEDURE — 36415 COLL VENOUS BLD VENIPUNCTURE: CPT

## 2018-11-26 PROCEDURE — 99215 OFFICE O/P EST HI 40 MIN: CPT | Performed by: NURSE PRACTITIONER

## 2018-11-26 NOTE — ASSESSMENT & PLAN NOTE
"Reports intermittent pain left upper quadrant pain.  CT in ER was negative for messenteric issues in 2014.  Wanting to do further testing.  States pain is at a '10\" most days.  No trauma strain reported.  Feels \"deep pain\".  No nausea, vomitting, change in stool.  No blood in urine.  No CVA tenderness.  No fever, chills reported.  No distention of abdomen reported.   "

## 2018-11-26 NOTE — ASSESSMENT & PLAN NOTE
Patient here for well woman exam today. Mammogram is current and WNL. Patient does have history of bloody nipple discharge in the past.

## 2018-11-26 NOTE — ASSESSMENT & PLAN NOTE
Mammogram/ultrasound recently were normal.  Continues to have bilateral bloody discharge right breast. Is interested in AMAS testing.

## 2018-11-26 NOTE — ASSESSMENT & PLAN NOTE
Results for DAMARIBROOKLYNNISHI NICKY WOLFE (MRN [id number]) as of 11/26/2018 09:27   Ref. Range 4/10/2018 08:36   Cholesterol,Tot Latest Ref Range: 100 - 199 mg/dL 255 (H)   Triglycerides Latest Ref Range: 0 - 149 mg/dL 163 (H)   HDL Latest Ref Range: >=40 mg/dL 64   LDL Latest Ref Range: <100 mg/dL 158 (H)   Cholesterol, triglycerides and LDL elevated.

## 2018-11-26 NOTE — ASSESSMENT & PLAN NOTE
Reports ongoing irregular periods with some short cycles and then longer cycles.  Discussed perimenopause and red flag warnings

## 2018-11-26 NOTE — ASSESSMENT & PLAN NOTE
Reports that the pain management practice has done EMG with carpal tunnel/ ulnar pain.  Has not seen hand specialist.

## 2018-11-26 NOTE — PROGRESS NOTES
"Chief Complaint   Patient presents with   • Menstrual Problem       Subjective:   Aracelis Farah is a 48 y.o. female here today with numerous complaints and concerns.      Well woman exam with routine gynecological exam  Patient here for well woman exam today. Mammogram is current and WNL. Patient does have history of bloody nipple discharge in the past.     Dyslipidemia  Results for ARACELIS FARAH (MRN [id number]) as of 11/26/2018 09:27   Ref. Range 4/10/2018 08:36   Cholesterol,Tot Latest Ref Range: 100 - 199 mg/dL 255 (H)   Triglycerides Latest Ref Range: 0 - 149 mg/dL 163 (H)   HDL Latest Ref Range: >=40 mg/dL 64   LDL Latest Ref Range: <100 mg/dL 158 (H)   Cholesterol, triglycerides and LDL elevated.      Lumbago  Seeing pain management and having injections, taking diclofenac.     Bilateral carpal tunnel syndrome  Reports that the pain management practice has done EMG with carpal tunnel/ ulnar pain.  Has not seen hand specialist.      Bloody discharge from right nipple  Mammogram/ultrasound recently were normal.  Continues to have bilateral bloody discharge right breast. Is interested in [facility]AS testing.     Left upper quadrant pain  Reports intermittent pain left upper quadrant pain.  CT in ER was negative for messenteric issues in 2014.  Wanting to do further testing.  States pain is at a '10\" most days.  No trauma strain reported.  Feels \"deep pain\".  No nausea, vomitting, change in stool.  No blood in urine.  No CVA tenderness.  No fever, chills reported.  No distention of abdomen reported.     Irregular periods/menstrual cycles  Reports ongoing irregular periods with some short cycles and then longer cycles.  Discussed perimenopause and red flag warnings      Patient is very much concerned about cancer and her pain    Current medicines (including changes today)  Current Outpatient Prescriptions   Medication Sig Dispense Refill   • Thyroid (NATURE-THROID PO) Take  by mouth.     • diclofenac EC " "(VOLTAREN) 75 MG Tablet Delayed Response      • diazepam (VALIUM) 10 MG tablet Take 10 mg by mouth every 6 hours as needed for Anxiety.     • progesterone (PROMETRIUM) 100 MG Cap Take 1 Cap by mouth every day. 30 Cap 11   • Non Formulary Request      • Cholecalciferol (VITAMIN D PO) Take  by mouth.     • MAGNESIUM PO Take  by mouth.     • thyroid (ARMOUR THYROID) 30 MG Tab Take 30 mg by mouth every day.     • TURMERIC PO Take  by mouth.       No current facility-administered medications for this visit.      She  has a past medical history of Allergy; Hypothyroidism; Insomnia; and Lumbago.    ROS as stated in hpi  No chest pain, no shortness of breath, + luq abdominal pain       Objective:     Blood pressure 116/70, pulse (!) 104, temperature 37.2 °C (99 °F), temperature source Temporal, resp. rate 14, height 1.778 m (5' 10\"), weight 79.8 kg (176 lb), last menstrual period 11/22/2018, SpO2 96 %, not currently breastfeeding. Body mass index is 25.25 kg/m².  afebrile  Physical Exam:  Constitutional: Alert, no distress.  Skin: Warm, dry, good turgor,no cyanosis, no rashes in visible areas.  Eye: Equal, round and reactive, conjunctiva clear, lids normal.  Ears: No tenderness, no discharge.  External canals are without any significant edema or erythema.  Tympanic membranes are without any inflammation, no effusion.  Gross auditory acuity is intact.  Nose: symmetrical without tenderness, no discharge.  Mouth/Throat: lips without lesion.  Oropharynx clear.  Throat without erythema, exudates or tonsillar enlargement.  Neck: Trachea midline, no masses, no obvious thyroid enlargement.. No cervical or supraclavicular lymphadenopathy. Range of motion within normal limits.  Neuro: Cranial nerves 2-12 grossly intact.  No sensory deficit.  Respiratory: Unlabored respiratory effort, lungs clear to auscultation, no wheezes, no ronchi.  Cardiovascular: Normal S1, S2, no murmur, no edema.  Abdomen: Soft, non-tender, no masses, no " guarding,  no hepatosplenomegaly.  MSK:  + phalen and + tinnel signs elicited bilaterally  Psych: Alert and oriented x3, normal affect and mood and judgement.        Assessment and Plan:   The following treatment plan was discussed    1. Screening for cervical cancer  Patient unable to do pap today due to menstruation.     2. Well woman exam with routine gynecological exam  Cancelled due to patient currently menstruating.     3. Dyslipidemia  Chronic, ongoing. Not at goal.  Most likely a familiy hyperlipidemia by history.  Patient unwilling to take statin at this time.  Discussed lifestyle adjustments.    - CBC WITH DIFFERENTIAL; Future  - COMP METABOLIC PANEL; Future  - Lipid Profile; Future  - VITAMIN D,25 HYDROXY; Future    4. Irregular periods/menstrual cycles  Chronic, ongoing. Irregular periods.  Discussed perimenopause.  Will check TSH as last check on hormone levels were WNL.  Monitor and follow.   - TSH; Future    5. Bilateral carpal tunnel syndrome  This is a new problem to me.  Acute ongoing. This is interferring with her ADL's and livelihood.  Urgent referral placed.  Continue with brace, ice, diclofenac.  Monitor.   - REFERRAL TO ORTHOPEDICS    6. Chronic right-sided low back pain with right-sided sciatica  Chronic, ongoing. Followed by pain management.     7. Bloody discharge from right nipple  Chronic, ongoing. Mammogram and ultrasound WNL.  Patient requesting a AMAS lab test for cancer.  I will do some research about this test and get back to patient.     8. Pain  Chronic, ongoing. Unstable.  Multiple joint pain and pain throughout body.  Hx of EBV as a young adult.  Labs ordered. Monitor and follow.  RF and CAREY were negative this year when checked.    - EBV CHRONIC/ACTIVE INFECTION; Future    9. Left upper quadrant pain  This is a new problem to me.  Chronic, ongoing. Patient reports deep pain.  Will order MRI to rule out any growth, abnormality in abdomen.  Monitor and follow results.  -  MR-ABDOMEN-WITH & W/O; Future    Total 40 minutes face-to-face time spent with patient, with greater than 50% of the total time discussing patient's numerous issues and symptoms as listed above in assessment and plan, as well as managing coordination of care for future evaluation and treatment.      Followup: Return in about 6 months (around 5/26/2019) for Long, Multiple issues.         Educated in proper administration of medication(s) ordered today including safety, possible SE, risks, benefits, rationale and alternatives to therapy.     Please note that this dictation was created using voice recognition software. I have made every reasonable attempt to correct obvious errors, but I expect that there are errors of grammar and possibly content that I did not discover before finalizing the note.

## 2018-11-28 LAB
EBV EA-D IGG SER-ACNC: <5 U/ML (ref 0–10.9)
EBV NA IGG SER IA-ACNC: 406 U/ML (ref 0–21.9)
EBV VCA IGG SER IA-ACNC: >750 U/ML (ref 0–21.9)

## 2018-12-05 ENCOUNTER — HOSPITAL ENCOUNTER (OUTPATIENT)
Dept: RADIOLOGY | Facility: MEDICAL CENTER | Age: 48
End: 2018-12-05
Attending: NURSE PRACTITIONER
Payer: COMMERCIAL

## 2018-12-05 DIAGNOSIS — R10.12 LEFT UPPER QUADRANT PAIN: ICD-10-CM

## 2018-12-05 PROCEDURE — 700117 HCHG RX CONTRAST REV CODE 255: Performed by: NURSE PRACTITIONER

## 2018-12-05 PROCEDURE — 74183 MRI ABD W/O CNTR FLWD CNTR: CPT

## 2018-12-05 PROCEDURE — A9585 GADOBUTROL INJECTION: HCPCS | Performed by: NURSE PRACTITIONER

## 2018-12-05 RX ORDER — GADOBUTROL 604.72 MG/ML
8 INJECTION INTRAVENOUS ONCE
Status: COMPLETED | OUTPATIENT
Start: 2018-12-05 | End: 2018-12-05

## 2018-12-05 RX ADMIN — GADOBUTROL 8 ML: 604.72 INJECTION INTRAVENOUS at 15:03

## 2019-02-20 ENCOUNTER — PATIENT MESSAGE (OUTPATIENT)
Dept: MEDICAL GROUP | Facility: PHYSICIAN GROUP | Age: 49
End: 2019-02-20

## 2019-02-20 DIAGNOSIS — M25.561 CHRONIC KNEE PAIN AFTER TOTAL REPLACEMENT OF RIGHT KNEE JOINT: ICD-10-CM

## 2019-02-20 DIAGNOSIS — G89.29 CHRONIC PAIN OF RIGHT KNEE: ICD-10-CM

## 2019-02-20 DIAGNOSIS — Z96.651 CHRONIC KNEE PAIN AFTER TOTAL REPLACEMENT OF RIGHT KNEE JOINT: ICD-10-CM

## 2019-02-20 DIAGNOSIS — M25.561 CHRONIC PAIN OF RIGHT KNEE: ICD-10-CM

## 2019-02-20 DIAGNOSIS — G89.29 CHRONIC KNEE PAIN AFTER TOTAL REPLACEMENT OF RIGHT KNEE JOINT: ICD-10-CM

## 2019-06-26 ENCOUNTER — HOSPITAL ENCOUNTER (OUTPATIENT)
Facility: MEDICAL CENTER | Age: 49
End: 2019-06-26
Attending: NURSE PRACTITIONER
Payer: COMMERCIAL

## 2019-06-26 ENCOUNTER — OFFICE VISIT (OUTPATIENT)
Dept: MEDICAL GROUP | Facility: PHYSICIAN GROUP | Age: 49
End: 2019-06-26
Payer: COMMERCIAL

## 2019-06-26 VITALS
BODY MASS INDEX: 25.48 KG/M2 | DIASTOLIC BLOOD PRESSURE: 70 MMHG | RESPIRATION RATE: 14 BRPM | OXYGEN SATURATION: 100 % | TEMPERATURE: 99 F | WEIGHT: 178 LBS | SYSTOLIC BLOOD PRESSURE: 112 MMHG | HEIGHT: 70 IN | HEART RATE: 77 BPM

## 2019-06-26 DIAGNOSIS — Z12.4 SCREENING FOR CERVICAL CANCER: ICD-10-CM

## 2019-06-26 DIAGNOSIS — N64.52 BLOODY DISCHARGE FROM RIGHT NIPPLE: ICD-10-CM

## 2019-06-26 DIAGNOSIS — R10.9 STOMACH PAIN: ICD-10-CM

## 2019-06-26 DIAGNOSIS — Z12.83 SKIN EXAM, SCREENING FOR CANCER: ICD-10-CM

## 2019-06-26 DIAGNOSIS — Z01.419 WELL WOMAN EXAM WITH ROUTINE GYNECOLOGICAL EXAM: ICD-10-CM

## 2019-06-26 DIAGNOSIS — N92.6 IRREGULAR PERIODS/MENSTRUAL CYCLES: ICD-10-CM

## 2019-06-26 PROCEDURE — 88175 CYTOPATH C/V AUTO FLUID REDO: CPT

## 2019-06-26 PROCEDURE — 87624 HPV HI-RISK TYP POOLED RSLT: CPT

## 2019-06-26 PROCEDURE — 99000 SPECIMEN HANDLING OFFICE-LAB: CPT | Performed by: NURSE PRACTITIONER

## 2019-06-26 PROCEDURE — 99386 PREV VISIT NEW AGE 40-64: CPT | Mod: 25 | Performed by: NURSE PRACTITIONER

## 2019-06-26 RX ORDER — NORGESTIMATE AND ETHINYL ESTRADIOL 0.25-0.035
1 KIT ORAL DAILY
Qty: 28 TAB | Refills: 11 | Status: SHIPPED | OUTPATIENT
Start: 2019-06-26

## 2019-06-26 ASSESSMENT — PATIENT HEALTH QUESTIONNAIRE - PHQ9: CLINICAL INTERPRETATION OF PHQ2 SCORE: 0

## 2019-06-26 NOTE — PROGRESS NOTES
CC:  Pap/Well Woman Exam    History of present illness:  Aracelis Seals is 48 y.o.white female presenting today for well woman exam with gynecological exam and Pap smear.   She reports her periods are irregular 918-29 days).  She is currently using  condoms as birth control. Diet . Exercise     Stomach pain  Reports new pain, nausea after using diclofenac.  Tried omeprazole without improvement of pain.  Had some bloody vomit, but not now.  Still having pain in stomach 4 months after stopping NSAID.      Irregular periods/menstrual cycles  Reports irregular periods.  Sister was menopausal at 55. Reports bleeding occurs every 18-29 days, lasting from 7-14 days.  Would like to consider bcp.  No history of blood clots, non-smoker.     Bloody discharge from right nipple  This is an occasional occurrence for the past 20 years, right breast.  Normal mammogram.  No discharge today on exam.     Well woman exam with routine gynecological exam  Here for well woman exam.  Due for yearly labs.  Requesting derm referral for yearly skin exam.  Up to date on immunizations and mammogram.       Past Medical History:   Diagnosis Date   • Allergy    • Hypothyroidism    • Insomnia    • Lumbago        Past Surgical History:   Procedure Laterality Date   • LAMINOTOMY  2008    dr jones       Outpatient Encounter Prescriptions as of 6/26/2019   Medication Sig Dispense Refill   • norgestimate-ethinyl estradiol (ORTHO-CYCLEN) 0.25-35 MG-MCG per tablet Take 1 Tab by mouth every day. 28 Tab 11   • Cholecalciferol (VITAMIN D PO) Take  by mouth.     • MAGNESIUM PO Take  by mouth.     • [DISCONTINUED] Thyroid (NATURE-THROID PO) Take  by mouth.     • diclofenac EC (VOLTAREN) 75 MG Tablet Delayed Response      • diazepam (VALIUM) 10 MG tablet Take 10 mg by mouth every 6 hours as needed for Anxiety.     • [DISCONTINUED] thyroid (ARMOUR THYROID) 30 MG Tab Take 30 mg by mouth every day.     • [DISCONTINUED] progesterone (PROMETRIUM) 100 MG Cap Take  "1 Cap by mouth every day. (Patient not taking: Reported on 6/26/2019) 30 Cap 11   • Non Formulary Request      • [DISCONTINUED] TURMERIC PO Take  by mouth.       No facility-administered encounter medications on file as of 6/26/2019.        Patient Active Problem List    Diagnosis Date Noted   • Stomach pain 06/26/2019   • Chronic pain of right knee 02/20/2019   • Well woman exam with routine gynecological exam 11/26/2018   • Dyslipidemia 11/26/2018   • Irregular periods/menstrual cycles 11/26/2018   • Bilateral carpal tunnel syndrome 11/26/2018   • Bloody discharge from right nipple 11/26/2018   • Left upper quadrant pain 11/26/2018   • Seasonal allergies 03/21/2018   • Neck pain, chronic 03/21/2018   • Menopausal syndrome (hot flashes) 03/21/2018   • Hypothyroidism    • Lumbago        .  Social History     Social History   • Marital status:      Spouse name: N/A   • Number of children: N/A   • Years of education: N/A     Occupational History   • Not on file.     Social History Main Topics   • Smoking status: Never Smoker   • Smokeless tobacco: Never Used   • Alcohol use 2.4 oz/week     4 Glasses of wine per week      Comment: week   • Drug use: No      Comment: cbd oil for inflammation   • Sexual activity: Yes     Partners: Male     Birth control/ protection: Condom     Other Topics Concern   • Not on file     Social History Narrative   • No narrative on file       Family History   Problem Relation Age of Onset   • Heart Disease Father    • Heart Disease Brother          ROS:  Denies Weight loss, fatigue, chest pain, SOB, bowel or bladder changes. No significant dysmenorrhea, concerning vaginal discharge or irritation, no dyspareunia or postcoital bleeding. Denies h/o migraine with aura. Denies musculoskeletal, neurological, or psychiatric problems.      /70 (BP Location: Left arm, Patient Position: Sitting)   Pulse 77   Temp 37.2 °C (99 °F) (Temporal)   Resp 14   Ht 1.778 m (5' 10\")   Wt 80.7 kg " (178 lb)   LMP 06/06/2019 (Exact Date)   SpO2 100%   BMI 25.54 kg/m²     GEN:  Appears well and in no apparent distress   NECK:  Supple without adenopathy or thyromegaly  LUNGS:  Clear and equal. No wheeze, ronchi, or rales.  CV:  RRR, S1, S2. No murmur.  Pedal pulses 2+ bilaterally.  BREAST:  Symmetrical without masses. No nipple discharge.  ABD:  Soft, non-tender, non-distended, normal bowel sounds.  No hepatosplenomegaly.  :  Normal external female genitalia.  Vaginal canal clear.  Cervix appears normal. Specimen collected from transformation zone. Bimanual exam:  No CMT, normal size uterus without masses or tenderness; no adnexal masses or tenderness.      Assessment and plan    1. Screening for cervical cancer  Pap obtained.  To pathology.  Monitor and follow  - THINPREP PAP WITH HPV; Future    1. Screening for cervical cancer  See #1  - THINPREP PAP WITH HPV; Future    2. Stomach pain  This is a new, acute issue.  Possible stomach ulcer from NSAIDs.  Referral to GI for consultation.  Continue to hold any nsaids.  Omeprazole prn.   - REFERRAL TO GASTROENTEROLOGY    3. Irregular periods/menstrual cycles  Chornic, ongoing, worsening.  Check labs for metabolic issues.  Will start oral contraceptives to help regulate cycle.  No high risks.  Monitor and follow.   - CBC WITH DIFFERENTIAL; Future  - Comp Metabolic Panel; Future  - Lipid Profile; Future  - TSH; Future  - VITAMIN D,25 HYDROXY; Future    4. Bloody discharge from right nipple  Chronic, ongoing. Stable.  Mammogram up to date.  No discharge today    5. Well woman exam with routine gynecological exam  Here for well woman.  No abnormal findings.        F/u pending results    A chaperone was offered to the patient during today's exam. Patient declined chaperone.

## 2019-06-27 LAB
CYTOLOGY REG CYTOL: NORMAL
HPV HR 12 DNA CVX QL NAA+PROBE: NEGATIVE
HPV16 DNA SPEC QL NAA+PROBE: NEGATIVE
HPV18 DNA SPEC QL NAA+PROBE: NEGATIVE
SPECIMEN SOURCE: NORMAL

## 2019-07-24 ENCOUNTER — HOSPITAL ENCOUNTER (OUTPATIENT)
Dept: LAB | Facility: MEDICAL CENTER | Age: 49
End: 2019-07-24
Attending: NURSE PRACTITIONER
Payer: COMMERCIAL

## 2019-07-24 DIAGNOSIS — N92.6 IRREGULAR PERIODS/MENSTRUAL CYCLES: ICD-10-CM

## 2019-07-24 LAB
25(OH)D3 SERPL-MCNC: 32 NG/ML (ref 30–100)
ALBUMIN SERPL BCP-MCNC: 4.3 G/DL (ref 3.2–4.9)
ALBUMIN/GLOB SERPL: 1.7 G/DL
ALP SERPL-CCNC: 30 U/L (ref 30–99)
ALT SERPL-CCNC: 18 U/L (ref 2–50)
ANION GAP SERPL CALC-SCNC: 6 MMOL/L (ref 0–11.9)
AST SERPL-CCNC: 16 U/L (ref 12–45)
BASOPHILS # BLD AUTO: 0.8 % (ref 0–1.8)
BASOPHILS # BLD: 0.04 K/UL (ref 0–0.12)
BILIRUB SERPL-MCNC: 0.5 MG/DL (ref 0.1–1.5)
BUN SERPL-MCNC: 12 MG/DL (ref 8–22)
CALCIUM SERPL-MCNC: 9.3 MG/DL (ref 8.5–10.5)
CHLORIDE SERPL-SCNC: 104 MMOL/L (ref 96–112)
CHOLEST SERPL-MCNC: 215 MG/DL (ref 100–199)
CO2 SERPL-SCNC: 27 MMOL/L (ref 20–33)
CREAT SERPL-MCNC: 0.72 MG/DL (ref 0.5–1.4)
EOSINOPHIL # BLD AUTO: 0.22 K/UL (ref 0–0.51)
EOSINOPHIL NFR BLD: 4.3 % (ref 0–6.9)
ERYTHROCYTE [DISTWIDTH] IN BLOOD BY AUTOMATED COUNT: 47.1 FL (ref 35.9–50)
FASTING STATUS PATIENT QL REPORTED: NORMAL
GLOBULIN SER CALC-MCNC: 2.6 G/DL (ref 1.9–3.5)
GLUCOSE SERPL-MCNC: 90 MG/DL (ref 65–99)
HCT VFR BLD AUTO: 41.9 % (ref 37–47)
HDLC SERPL-MCNC: 53 MG/DL
HGB BLD-MCNC: 13.5 G/DL (ref 12–16)
IMM GRANULOCYTES # BLD AUTO: 0.02 K/UL (ref 0–0.11)
IMM GRANULOCYTES NFR BLD AUTO: 0.4 % (ref 0–0.9)
LDLC SERPL CALC-MCNC: 146 MG/DL
LYMPHOCYTES # BLD AUTO: 1.72 K/UL (ref 1–4.8)
LYMPHOCYTES NFR BLD: 33.9 % (ref 22–41)
MCH RBC QN AUTO: 32.7 PG (ref 27–33)
MCHC RBC AUTO-ENTMCNC: 32.2 G/DL (ref 33.6–35)
MCV RBC AUTO: 101.5 FL (ref 81.4–97.8)
MONOCYTES # BLD AUTO: 0.33 K/UL (ref 0–0.85)
MONOCYTES NFR BLD AUTO: 6.5 % (ref 0–13.4)
NEUTROPHILS # BLD AUTO: 2.75 K/UL (ref 2–7.15)
NEUTROPHILS NFR BLD: 54.1 % (ref 44–72)
NRBC # BLD AUTO: 0 K/UL
NRBC BLD-RTO: 0 /100 WBC
PLATELET # BLD AUTO: 212 K/UL (ref 164–446)
PMV BLD AUTO: 9 FL (ref 9–12.9)
POTASSIUM SERPL-SCNC: 4 MMOL/L (ref 3.6–5.5)
PROT SERPL-MCNC: 6.9 G/DL (ref 6–8.2)
RBC # BLD AUTO: 4.13 M/UL (ref 4.2–5.4)
SODIUM SERPL-SCNC: 137 MMOL/L (ref 135–145)
TRIGL SERPL-MCNC: 82 MG/DL (ref 0–149)
TSH SERPL DL<=0.005 MIU/L-ACNC: 2.6 UIU/ML (ref 0.38–5.33)
WBC # BLD AUTO: 5.1 K/UL (ref 4.8–10.8)

## 2019-07-24 PROCEDURE — 36415 COLL VENOUS BLD VENIPUNCTURE: CPT

## 2019-07-24 PROCEDURE — 80053 COMPREHEN METABOLIC PANEL: CPT

## 2019-07-24 PROCEDURE — 85025 COMPLETE CBC W/AUTO DIFF WBC: CPT

## 2019-07-24 PROCEDURE — 82306 VITAMIN D 25 HYDROXY: CPT

## 2019-07-24 PROCEDURE — 80061 LIPID PANEL: CPT

## 2019-07-24 PROCEDURE — 84443 ASSAY THYROID STIM HORMONE: CPT

## 2019-07-29 ENCOUNTER — PATIENT MESSAGE (OUTPATIENT)
Dept: MEDICAL GROUP | Facility: PHYSICIAN GROUP | Age: 49
End: 2019-07-29

## 2019-07-29 RX ORDER — NORTRIPTYLINE HYDROCHLORIDE 10 MG/1
10 CAPSULE ORAL 2 TIMES DAILY
Qty: 60 CAP | Refills: 1 | Status: SHIPPED | OUTPATIENT
Start: 2019-07-29

## 2019-10-01 ENCOUNTER — APPOINTMENT (RX ONLY)
Dept: URBAN - METROPOLITAN AREA CLINIC 22 | Facility: CLINIC | Age: 49
Setting detail: DERMATOLOGY
End: 2019-10-01

## 2019-10-01 DIAGNOSIS — L91.0 HYPERTROPHIC SCAR: ICD-10-CM

## 2019-10-01 DIAGNOSIS — Z71.89 OTHER SPECIFIED COUNSELING: ICD-10-CM

## 2019-10-01 DIAGNOSIS — D22 MELANOCYTIC NEVI: ICD-10-CM

## 2019-10-01 DIAGNOSIS — T07XXXA ABRASION OR FRICTION BURN OF OTHER, MULTIPLE, AND UNSPECIFIED SITES, WITHOUT MENTION OF INFECTION: ICD-10-CM

## 2019-10-01 DIAGNOSIS — D18.0 HEMANGIOMA: ICD-10-CM

## 2019-10-01 PROBLEM — S00.30XA UNSPECIFIED SUPERFICIAL INJURY OF NOSE, INITIAL ENCOUNTER: Status: ACTIVE | Noted: 2019-10-01

## 2019-10-01 PROBLEM — D22.5 MELANOCYTIC NEVI OF TRUNK: Status: ACTIVE | Noted: 2019-10-01

## 2019-10-01 PROBLEM — D18.01 HEMANGIOMA OF SKIN AND SUBCUTANEOUS TISSUE: Status: ACTIVE | Noted: 2019-10-01

## 2019-10-01 PROCEDURE — ? INTRALESIONAL KENALOG

## 2019-10-01 PROCEDURE — ? PRESCRIPTION

## 2019-10-01 PROCEDURE — 99203 OFFICE O/P NEW LOW 30 MIN: CPT | Mod: 25

## 2019-10-01 PROCEDURE — 11900 INJECT SKIN LESIONS </W 7: CPT

## 2019-10-01 PROCEDURE — ? COUNSELING

## 2019-10-01 RX ORDER — MUPIROCIN 20 MG/G
OINTMENT TOPICAL
Qty: 1 | Refills: 0 | Status: ERX | COMMUNITY
Start: 2019-10-01

## 2019-10-01 RX ADMIN — MUPIROCIN 1: 20 OINTMENT TOPICAL at 00:00

## 2019-10-01 ASSESSMENT — LOCATION SIMPLE DESCRIPTION DERM
LOCATION SIMPLE: RIGHT NOSE
LOCATION SIMPLE: RIGHT ANTERIOR NECK
LOCATION SIMPLE: CHEST
LOCATION SIMPLE: ABDOMEN
LOCATION SIMPLE: UPPER BACK

## 2019-10-01 ASSESSMENT — LOCATION DETAILED DESCRIPTION DERM
LOCATION DETAILED: EPIGASTRIC SKIN
LOCATION DETAILED: SUPERIOR THORACIC SPINE
LOCATION DETAILED: RIGHT NARIS
LOCATION DETAILED: INFERIOR THORACIC SPINE
LOCATION DETAILED: LOWER STERNUM
LOCATION DETAILED: RIGHT INFERIOR ANTERIOR NECK

## 2019-10-01 ASSESSMENT — LOCATION ZONE DERM
LOCATION ZONE: NOSE
LOCATION ZONE: NECK
LOCATION ZONE: TRUNK

## 2019-10-01 NOTE — PROCEDURE: INTRALESIONAL KENALOG
Consent: The risks of atrophy were reviewed with the patient.
Kenalog Preparation: Kenalog
Expiration Date For Kenalog (Optional): 05/2020
Lot # For Kenalog (Optional): [id number]
X Size Of Lesion In Cm (Optional): 0
Medical Necessity Clause: This procedure was medically necessary because the lesions that were treated were:
Total Volume (Ccs): 0.05
Detail Level: Detailed
Treatment Number (Optional): 1
Concentration Of Kenalog Solution Injected (Mg/Ml): 10.0
Include Z78.9 (Other Specified Conditions Influencing Health Status) As An Associated Diagnosis?: No
Administered By (Optional): Dr Medley

## 2019-10-01 NOTE — HPI: SECONDARY COMPLAINT
How Severe Is This Condition?: mild
Additional History: Patient had a laser treatment and was burned, would like to see if there is anything that can help.

## 2020-01-03 DIAGNOSIS — Z12.31 VISIT FOR SCREENING MAMMOGRAM: ICD-10-CM

## 2020-04-02 ENCOUNTER — TELEPHONE (OUTPATIENT)
Dept: OCCUPATIONAL THERAPY | Facility: REHABILITATION | Age: 50
End: 2020-04-02

## 2020-04-02 NOTE — OP THERAPY DISCHARGE SUMMARY
Outpatient Occupational Therapy  DISCHARGE SUMMARY NOTE    [facility] [address].  [address]  [place] [address]  Phone:  [phone]  Fax:  [phone]    Date of Visit: 04/02/2020    Patient: Aracelis Seals  YOB: 1970  MRN: [id number]     Referring Provider: No referring provider defined for this encounter.   Referring Diagnosis: No admission diagnoses are documented for this encounter.     Occupational Therapy Occurrence Codes    Date of onset of impairment:  8/5/17   Date of occupational therapy care plan established or reviewed:  6/1/18   Date of occupational therapy treatment started:  6/1/18                Your patient is being discharged from Occupational Therapy with the following comments:   · Patient has failed to schedule or reschedule follow-up visits    Comments:  Pt never called to schedule follow-up appointments after her initial evaluation.    Limitations Remaining:  unknown    Recommendations:  D/C OT.    Krishna Petit MS,OTR/L    Date: 4/2/2020

## 2020-09-02 ENCOUNTER — PATIENT MESSAGE (OUTPATIENT)
Dept: MEDICAL GROUP | Facility: PHYSICIAN GROUP | Age: 50
End: 2020-09-02

## 2021-02-22 DIAGNOSIS — Z00.6 RESEARCH STUDY PATIENT: ICD-10-CM

## 2021-02-23 ENCOUNTER — HOSPITAL ENCOUNTER (OUTPATIENT)
Facility: MEDICAL CENTER | Age: 51
End: 2021-02-23
Attending: PATHOLOGY
Payer: COMMERCIAL

## 2021-02-24 DIAGNOSIS — Z00.6 RESEARCH STUDY PATIENT: ICD-10-CM

## 2021-03-06 LAB
ELF SCORE: 8.5
RELATIVE RISK: NORMAL
RISK GROUP: NORMAL
RISK: 3.3 %

## 2022-09-06 ENCOUNTER — APPOINTMENT (RX ONLY)
Dept: URBAN - METROPOLITAN AREA CLINIC 22 | Facility: CLINIC | Age: 52
Setting detail: DERMATOLOGY
End: 2022-09-06

## 2022-09-06 DIAGNOSIS — L72.8 OTHER FOLLICULAR CYSTS OF THE SKIN AND SUBCUTANEOUS TISSUE: ICD-10-CM

## 2022-09-06 DIAGNOSIS — Z71.89 OTHER SPECIFIED COUNSELING: ICD-10-CM

## 2022-09-06 DIAGNOSIS — L81.4 OTHER MELANIN HYPERPIGMENTATION: ICD-10-CM

## 2022-09-06 DIAGNOSIS — D22 MELANOCYTIC NEVI: ICD-10-CM

## 2022-09-06 PROBLEM — D22.5 MELANOCYTIC NEVI OF TRUNK: Status: ACTIVE | Noted: 2022-09-06

## 2022-09-06 PROCEDURE — ? COUNSELING

## 2022-09-06 PROCEDURE — 99213 OFFICE O/P EST LOW 20 MIN: CPT

## 2022-09-06 PROCEDURE — ? SUNSCREEN TREATMENT REGIMEN

## 2022-09-06 ASSESSMENT — LOCATION DETAILED DESCRIPTION DERM
LOCATION DETAILED: LEFT VENTRAL PROXIMAL FOREARM
LOCATION DETAILED: INFERIOR MID FOREHEAD
LOCATION DETAILED: RIGHT VENTRAL PROXIMAL FOREARM
LOCATION DETAILED: RIGHT INFERIOR CENTRAL MALAR CHEEK
LOCATION DETAILED: SUPERIOR THORACIC SPINE

## 2022-09-06 ASSESSMENT — LOCATION ZONE DERM
LOCATION ZONE: ARM
LOCATION ZONE: FACE
LOCATION ZONE: TRUNK

## 2022-09-06 ASSESSMENT — LOCATION SIMPLE DESCRIPTION DERM
LOCATION SIMPLE: LEFT FOREARM
LOCATION SIMPLE: UPPER BACK
LOCATION SIMPLE: INFERIOR FOREHEAD
LOCATION SIMPLE: RIGHT CHEEK
LOCATION SIMPLE: RIGHT FOREARM

## 2023-08-11 NOTE — ASSESSMENT & PLAN NOTE
Here for well woman exam.  Due for yearly labs.  Requesting derm referral for yearly skin exam.  Up to date on immunizations and mammogram.   
Reports irregular periods.  Sister was menopausal at 55. Reports bleeding occurs every 18-29 days, lasting from 7-14 days.  Would like to consider bcp.  No history of blood clots, non-smoker.   
Reports new pain, nausea after using diclofenac.  Tried omeprazole without improvement of pain.  Had some bloody vomit, but not now.  Still having pain in stomach 4 months after stopping NSAID.    
This is an occasional occurrence for the past 20 years, right breast.  Normal mammogram.  No discharge today on exam.   
[FreeTextEntry3] : Procedure Name: cmc1 l Injection: Celestone, Lidocaine, Marcaine, Evaluation and Guidance Ultrasound Injection was performed because of pain and inflammation. Anesthesia: ethyl chloride sprayed topically.  Celestone: 2 cc.  Lidocaine: 2 cc.  Marcaine: 2 cc.  Medication was injected. Patient has tried OTC's including aspirin, Ibuprofen, Aleve etc or prescription NSAIDS, and/or exercises at home and/ or physical therapy without satisfactory response. After verbal consent using sterile preparation and technique. The risks, benefits, and alternatives to cortisone injection were explained in full to the patient. Risks outlined include but are not limited to infection, sepsis, bleeding, scarring, skin discoloration, temporary increase in pain, syncopal episode, failure to resolve symptoms, allergic reaction, symptom recurrence, and elevation of blood sugar in diabetics. Patient understood the risks. All questions were answered. After discussion of options, patient requested an injection. Oral informed consent was obtained and sterile prep was done of the injection site. Sterile technique was utilized for the procedure including the preparation of the solutions used for the injection. Patient tolerated the procedure well. Advised to ice the injection site this evening. Prep with betadine locally to site. Sterile technique used.  Ultrasound guided injection was performed. Visualization of the needle and placement of injection was performed without complication.

## 2025-05-27 NOTE — TELEPHONE ENCOUNTER
----- Message from ZACK Dumas sent at 4/16/2018  9:08 AM PDT -----  Aracelis  A second note on your lab results.  ALL the tests for autoimmune disease were negative.  Good news.  ZACK Dumas     No